# Patient Record
Sex: MALE | Race: WHITE | NOT HISPANIC OR LATINO | Employment: OTHER | URBAN - METROPOLITAN AREA
[De-identification: names, ages, dates, MRNs, and addresses within clinical notes are randomized per-mention and may not be internally consistent; named-entity substitution may affect disease eponyms.]

---

## 2019-07-02 ENCOUNTER — HOSPITAL ENCOUNTER (EMERGENCY)
Facility: CLINIC | Age: 29
Discharge: HOME | End: 2019-07-02
Attending: EMERGENCY MEDICINE | Admitting: EMERGENCY MEDICINE
Payer: COMMERCIAL

## 2019-07-02 ENCOUNTER — APPOINTMENT (OUTPATIENT)
Dept: GENERAL RADIOLOGY | Facility: CLINIC | Age: 29
End: 2019-07-02
Attending: EMERGENCY MEDICINE
Payer: COMMERCIAL

## 2019-07-02 VITALS
SYSTOLIC BLOOD PRESSURE: 140 MMHG | TEMPERATURE: 98.2 F | DIASTOLIC BLOOD PRESSURE: 87 MMHG | OXYGEN SATURATION: 100 % | RESPIRATION RATE: 16 BRPM

## 2019-07-02 DIAGNOSIS — S60.552A: ICD-10-CM

## 2019-07-02 PROCEDURE — 10120 INC&RMVL FB SUBQ TISS SMPL: CPT

## 2019-07-02 PROCEDURE — 76882 US LMTD JT/FCL EVL NVASC XTR: CPT

## 2019-07-02 PROCEDURE — 99284 EMERGENCY DEPT VISIT MOD MDM: CPT | Mod: 25

## 2019-07-02 PROCEDURE — 73130 X-RAY EXAM OF HAND: CPT | Mod: LT

## 2019-07-02 RX ORDER — LIDOCAINE HYDROCHLORIDE AND EPINEPHRINE 10; 10 MG/ML; UG/ML
INJECTION, SOLUTION INFILTRATION; PERINEURAL
Status: DISCONTINUED
Start: 2019-07-02 | End: 2019-07-02 | Stop reason: HOSPADM

## 2019-07-02 RX ORDER — LIDOCAINE HYDROCHLORIDE 10 MG/ML
INJECTION, SOLUTION INFILTRATION; PERINEURAL
Status: DISCONTINUED
Start: 2019-07-02 | End: 2019-07-02 | Stop reason: HOSPADM

## 2019-07-02 ASSESSMENT — ENCOUNTER SYMPTOMS: NUMBNESS: 0

## 2019-07-02 NOTE — ED AVS SNAPSHOT
[facility] Emergency Department  [address]  [facility] [address]  Phone:  [phone]  Fax:  [phone]                                    Raymond Murguia   MRN: [id number]    Department:  [facility] Emergency Department   Date of Visit:  7/2/2019           After Visit Summary Signature Page    I have received my discharge instructions, and my questions have been answered. I have discussed any challenges I see with this plan with the nurse or doctor.    ..........................................................................................................................................  Patient/Patient Representative Signature      ..........................................................................................................................................  Patient Representative Print Name and Relationship to Patient    ..................................................               ................................................  Date                                   Time    ..........................................................................................................................................  Reviewed by Signature/Title    ...................................................              ..............................................  Date                                               Time          22EPIC Rev 08/18

## 2019-07-02 NOTE — ED TRIAGE NOTES
Patient presents with foreign body in hand, specifically left thumb/wrist, where he shot a nail into his hand around 2 hours ago. Tetanus UTD. ABCDs intact, alert and oriented x 4.

## 2019-07-02 NOTE — ED PROVIDER NOTES
History     Chief Complaint:  Hand Injury    The history is provided by the patient.      Raymond Murguia is a right-handed, 29 year old male who presents with a left hand injury. 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 Patient tried removing the nail but his hand started swelling, prompting him to the ED. Here, patient also has numbness and tingling in the hand but no pain in his other fingers. He reports he is able to move the hand and fingers normally but moving his thumb increases his pain. He denies any other injuries. No bleeding. He states that he tried to cut the skin near where the nail went into the hand but was not able to do this successfully to get the nail out.     Last tetanus shot: 2/2/2015.    Allergies:  NKDA     Medications:    The patient is not currently taking any prescribed medications.    Past Medical History:    The patient denies any significant past medical history.    Past Surgical History:    The patient does not have any pertinent past surgical history.    Family History:    No past pertinent family history.    Social History:  Presents alone to the ED.       Review of Systems   Musculoskeletal:        Positive for left hand injury.   Neurological: Negative for numbness.   All other systems reviewed and are negative.      Physical Exam     Patient Vitals for the past 24 hrs:   BP Temp Temp src Heart Rate Resp SpO2   07/02/19 1733 140/87 98.2  F (36.8  C) Temporal 69 16 100 %     Physical Exam  General: Resting comfortably  Head:  Scalp, face, and head appear normal  Eyes:  Pupils equal, round    Conjunctivae noninjected and sclera white  ENT:    The nose is normal    Ears/pinnae are normal  Neck:  Normal range of motion  CV:  Left radial pulse 2+ and strong.  Capillary refill less than 2 seconds in all fingers of the left hand.   Resp:  Respirations are even and unlabored  MSK:  Tiny  pinpoint wound to the dorsal aspect of the left hand near the base of the thumb proximal to the MCP joint.  The thenar eminence on the ventral side of the hand as well as the dorsal aspect of the hand is with moderate swelling and tenderness to palpation.  No bruising or skin color change.  No bleeding.  No palpable foreign body underneath of the skin.  Increased pain with movement of the left thumb.  Patient with full flexion and extension of the remainder of the fingers without pain or difficulty.  Sensation intact to light touch in all fingers.  Skin:  No rash or lesions noted.  Neuro: Speech is normal and fluent    Moves all extremities spontaneously  Psych:  Awake, Alert. Normal affect      Appropriate interactions    Emergency Department Course   Imaging:  Radiographic findings were communicated with the patient who voiced understanding of the findings.    POC US SOFT TISSUE   Final Result   Limited Soft Tissue Ultrasound, performed and interpreted by myself, Rosendo Levin MD      Indication:  Soft tissue foreign body      Body location: Dorsal left hand.      Findings:  A very small hyperechoic structure with posterior shadowing is seen at approx 0.5cm deep to skin in the interrogated area of the dorsum of the left hand. No associated fluid collection.       IMPRESSION: Apparent hyperechoic foreign body visualized in the tissues of the dorsum of the left hand consistent with the clinical history.               XR Hand Left G/E 3 Views   Final Result   IMPRESSION: There is a 3.7 cm linear metallic foreign body in the soft   tissue of the lateral hand which appears to abut the first metacarpal   base. No acute fracture. Mild soft tissue swelling. There is normal   joint spacing and alignment.      BRENDA HICKS MD        Procedures:       Procedure: Foreign Body Removal     LOCATIONS:  Dorsum of left hand overlying the region of the 1st MCP joint.    ANESTHESIA:  Local field block using Lidocaine 1% with  epinephrine, total of 3 mLs    PREPARATION:  Cleansed with Betadine    PROCEDURE:  After identifying the location of the FB on ultrasound 1 cm skin incision  with # 15 Blade was made and the underlying soft tissue bluntly dissected.  The wound was visually inspected and probed with a straight hemostat and forceps. The foreign body unfortunately was unable to be located and the procedure was aborted due to concern for further tissue or nerve injury.     Patient Status: Patient tolerated the procedure well. There were no complications.    Emergency Department Course:  1745 Nursing notes and vitals reviewed. I performed an exam of the patient as documented above.     The patient was sent for a left hand x-ray while in the emergency department, findings above.     1834 I performed a left hand ultrasound on the patient, see results above.    1900 I made an incision on the patient and was unable to remove the foreign object.     1923 I consulted with Dr. Naik, Hand Orthopedics, regarding the patient's history and presentation here in the emergency department.    2005 I rechecked the patient and discussed the results of his workup thus far.     Findings and plan explained to the Patient. Patient discharged home with instructions regarding supportive care, medications, and reasons to return. The importance of close follow-up was reviewed.     I personally reviewed and answered all related questions prior to discharge.     Impression & Plan    Medical Decision Making:  Raymond Murguia is a 29 year old male who presents with nail gun injury to the left hand. He reports he has a small gauge thin nail lodged in the back of his left hand, as noted above. On my evaluation, CMS are intact in the left hand. The FB body is not palpable at the surface of the skin. X-rays were obtained which does reveal a linear metal foreign object consistent with the described nail at the dorsal aspect of the hand. That said, ultrasound was performed  in to attempt to identify the foreign object. Visualization was difficult on ultrasound likely due to the small narrow thickness of the foreign body. I discussed with the patient options for removal with the first option being following up in the clinic with hand surgery. The patient however desired that I attempt removal and I discussed that this might not be possible given the location of the foreign body and the size. Laceration was made in the area of the suspected foreign body. Despite my attempt, I was not able to locate and remove the foreign object. Therefore, the case was discussed with hand surgeon, Dr. Naik, who graciously presented to the ED and was able to successfully remove the foreign object. She placed stiches in the wound and the wound was bandaged. The stitches should be removed in 10 days and care instructions were provided. Patient's tetanus is up to date and he was discharged in improved condition. No need for antibiotics per Dr. Naik. Tetanus up to date.     Critical Care time:  none    Diagnosis:    ICD-10-CM    1. Penetrating foreign body of skin of left hand, initial encounter S60.552A      Disposition:  discharged to home    Scribe Disposition  I, Radha Saldana, am serving as a scribe on 7/2/2019 at 6:18 PM to personally document services performed by Rosendo Levin MD based on my observations and the provider's statements to me.     Radha Saldana  7/2/2019   [facility] EMERGENCY DEPARTMENT       Rosendo Levin MD  07/03/19 8924

## 2019-07-03 NOTE — CONSULTS
Consult Date:  07/02/2019      CHIEF COMPLAINT:  Foreign body, left thumb.      HISTORY OF PRESENT ILLNESS:  The patient was working earlier today when a nail misfired into his dorsal thumb.  He was seen in [facility] Emergency Room and x-rays demonstrate a thin nail remaining in the first web space.  It is obliquely oriented with the entry at the dorsal carpometacarpal joint.  The emergency room physician had tried to find it but was unsuccessful.  After discussion, given the level of the injury and the patients tolerance for exploration, we elected to proceed with another attempt in the emergency room.      PAST MEDICAL HISTORY:  Noncontributory.      MEDICATIONS:  None.      ALLERGIES:  NONE.      PHYSICAL EXAMINATION:  There is a small incision overlying the dorsal aspect of the thumb CMC joint.  There was no bleeding and tenderness in the first web space.  He has pain with initiation of flexion of the index and distally.  Sensation is intact.      X-RAYS:  X-rays were reviewed which demonstrate a foreign body extending from the dorsal CMC joint level in the oblique distal and palmar fashion to the volar aspect of the second MCP.      After discussion with the patient, he would like to proceed with another attempt to find the foreign body.  I think this is reasonable given the location and the patient's tolerance.      PROCEDURE:  Dr. Levin had made an incision.  The edges were instilled with 1% lidocaine.  The wound was then explored in a circumferential fashion beginning superiorly and using spreading dissection through the dorsal interosseous muscle.  Eventually the foreign body was identified.  It was essentially on the dorsal cortical surface of the metacarpal.  Once identified, it was extracted without difficulty.  He had good early relief of symptoms.  There was no bleeding, although the artery was visualized in the dorsal aspect of the exploration.  The wound was then irrigated with saline and closed with  nylon suture.  A bulky sterile dressing was applied.      Precautions and activities discussed. Elevation is recommended to reduce swelling for 24-48 hours.  He was given contact information for follow-up. He could remove his own sutures in 10 days and return as needed        ADELIA MCDUFFIE MD             D: 2019   T: 2019   MT: DINA      Name:     GRAHAM ALLEN   MRN:      [id number]        Account:       [id number]   :      1990           Consult Date:  2019      Document: [id number]

## 2019-07-08 NOTE — CONSULTS
Consult Date:  07/02/2019      Revised      CHIEF COMPLAINT:  Foreign body, left thumb.       HISTORY OF PRESENT ILLNESS:  The patient was working earlier today when a nail misfired into his dorsal thumb.  He was seen in [facility] Emergency Room and x-rays demonstrate a thin nail remaining in the first web space.  It is obliquely oriented with the entry at the dorsal carpometacarpal joint.  The emergency room physician had tried to find it but was unsuccessful.  After discussion, given the level of the injury and the patients tolerance for exploration, we elected to proceed with another attempt in the emergency room.       PAST MEDICAL HISTORY:  Noncontributory.       MEDICATIONS:  None.       ALLERGIES:  NONE.       PHYSICAL EXAMINATION:  There is a small incision overlying the dorsal aspect of the thumb CMC joint.  There was no bleeding and tenderness in the first web space.  He has pain with initiation of flexion of the index and distally.  Sensation is intact.       X-RAYS:  X-rays were reviewed which demonstrate a foreign body extending from the dorsal CMC joint level in the oblique distal and palmar fashion to the volar aspect of the second MCP.       After discussion with the patient, he would like to proceed with another attempt to find the foreign body.  I think this is reasonable given the location and the patient's tolerance.       PROCEDURE:  Dr. Levin had made an incision.  The edges were instilled with 1% lidocaine.  The wound was then explored in a circumferential fashion beginning superiorly and using spreading dissection through the dorsal interosseous muscle.  Eventually the foreign body was identified.  It was essentially on the dorsal cortical surface of the metacarpal.  Once identified, it was extracted without difficulty.  He had good early relief of symptoms.  There was no bleeding, although the artery was visualized in the dorsal aspect of the exploration.  The wound was then irrigated with  saline and closed with nylon suture.  A bulky sterile dressing was applied.       Precautions and use of hands discussed.  Ice for swelling for 24-48 hours.  He was given contact information for follow-up. He could remove his own sutures at that time.      Revised text lg 19                  ADELIA MCDUFFIE MD             D: 2019   T: 2019   MT: DINA      Name:     GRAHAM ALLEN   MRN:      [id number]        Account:       [id number]   :      1990           Consult Date:  2019      Document: [id number]

## 2024-06-04 ENCOUNTER — OFFICE VISIT (OUTPATIENT)
Dept: DERMATOLOGY | Facility: CLINIC | Age: 34
End: 2024-06-04
Payer: COMMERCIAL

## 2024-06-04 DIAGNOSIS — D18.01 ANGIOMA OF SKIN: ICD-10-CM

## 2024-06-04 DIAGNOSIS — L81.4 LENTIGO: ICD-10-CM

## 2024-06-04 DIAGNOSIS — D22.9 NEVUS: Primary | ICD-10-CM

## 2024-06-04 PROCEDURE — 99203 OFFICE O/P NEW LOW 30 MIN: CPT | Performed by: PHYSICIAN ASSISTANT

## 2024-06-04 NOTE — LETTER
6/4/2024         RE: Raymond Murguia  [address]  [facility] [address]        Dear Colleague,    Thank you for referring your patient, Raymond Murguia, to the [facility]. Please see a copy of my visit note below.    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   ROS of the following were done and are negative: Constitutional, Eyes, Ears, Nose,   Mouth, Throat, Cardiovascular, Respiratory, GI, Genitourinary, Musculoskeletal,   Psychiatric, Endocrine, Allergic/Immunologic.    PHYSICAL EXAM:   There were no vitals taken for this visit.  Skin exam performed as follows: Type 1 skin. Mood appropriate  Alert and Oriented X 3. Well developed, well nourished in no distress.  General appearance: Normal  Head including face: Normal  Eyes: conjunctiva and lids: Normal  Mouth: Lips, teeth, gums: Normal  Neck: Normal  Chest-breast/axillae: Normal  Back: Normal  Extremities: digits/nails (clubbing): Normal  Eccrine and Apocrine glands: Normal  Right upper extremity: Normal  Left upper extremity: Normal  Right lower extremity: Normal  Left lower extremity: Normal  Skin: Scalp and body hair: See below    Pt deferred exam of breasts, groin, buttocks: No    Other physical findings:  1. Multiple pigmented macules on extremities and trunk  2. Multiple pigmented  macules on face, trunk and extremities  3. Multiple vascular papules on trunk, arms and legs       Except as noted above, no other signs of skin cancer or melanoma.     ASSESSMENT/PLAN:   Benign Full skin cancer screening today. . Patient with history of none  Advised on monthly self exams and 1 year  Patient Education: Appropriate brochures given.    Multiple benign appearing melanocytic nevi on arms, legs and trunk. Discussed ABCDEs of melanoma and sunscreen.   Multiple lentigos on arms, legs and trunk. Advised benign, no treatment needed.  Multiple scattered angiomas. Advised benign, no treatment needed.             Follow-up: 1 year/PRN sooner    1.) Patient was asked about new and changing moles. YES  2.) Patient received a complete physical skin examination: YES  3.) Patient was counseled to perform a monthly self skin examination: YES  Scribed By: Kirstin Palm, MS, PAMckenzieC      Again, thank you for allowing me to participate in the care of your patient.        Sincerely,        Kirstin Palm PA-C

## 2024-06-04 NOTE — PROGRESS NOTES
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   ROS of the following were done and are negative: Constitutional, Eyes, Ears, Nose,   Mouth, Throat, Cardiovascular, Respiratory, GI, Genitourinary, Musculoskeletal,   Psychiatric, Endocrine, Allergic/Immunologic.    PHYSICAL EXAM:   There were no vitals taken for this visit.  Skin exam performed as follows: Type 1 skin. Mood appropriate  Alert and Oriented X 3. Well developed, well nourished in no distress.  General appearance: Normal  Head including face: Normal  Eyes: conjunctiva and lids: Normal  Mouth: Lips, teeth, gums: Normal  Neck: Normal  Chest-breast/axillae: Normal  Back: Normal  Extremities: digits/nails (clubbing): Normal  Eccrine and Apocrine glands: Normal  Right upper extremity: Normal  Left upper extremity: Normal  Right lower extremity: Normal  Left lower extremity: Normal  Skin: Scalp and body hair: See below    Pt deferred exam of breasts, groin, buttocks: No    Other physical findings:  1. Multiple pigmented macules on extremities and trunk  2. Multiple pigmented macules on face, trunk and extremities  3. Multiple vascular papules on trunk, arms and legs       Except as noted above, no other signs of skin cancer or melanoma.     ASSESSMENT/PLAN:   Benign Full skin cancer screening today. . Patient with history of none  Advised on  monthly self exams and 1 year  Patient Education: Appropriate brochures given.    Multiple benign appearing melanocytic nevi on arms, legs and trunk. Discussed ABCDEs of melanoma and sunscreen.   Multiple lentigos on arms, legs and trunk. Advised benign, no treatment needed.  Multiple scattered angiomas. Advised benign, no treatment needed.             Follow-up: 1 year/PRN sooner    1.) Patient was asked about new and changing moles. YES  2.) Patient received a complete physical skin examination: YES  3.) Patient was counseled to perform a monthly self skin examination: YES  Scribed By: Kirstin Palm MS, PA-C